# Patient Record
(demographics unavailable — no encounter records)

---

## 2024-11-12 NOTE — PLAN
[FreeTextEntry1] : Hand shaking/intention tremors.  Will check bw, especially thyroid levels.  If bw normal will refer to Neurology.    Pt had flu and covid vaccine at pharmacy.

## 2024-11-12 NOTE — HISTORY OF PRESENT ILLNESS
[FreeTextEntry1] : Pt here for follow up. c/o noticing that her hands have been shaking a little lately, especially when putting in earrings or trying to paint her nails. No tremors at rest.

## 2024-11-12 NOTE — PHYSICAL EXAM
[Normal] : normal rate, regular rhythm, normal S1 and S2 and no murmur heard [de-identified] : no visible hand tremors at rest

## 2025-04-22 NOTE — PHYSICAL EXAM
[Normal S1, S2] : normal S1, S2 [Normal] : soft, non-tender, no masses/organomegaly, normal bowel sounds [No Edema] : no edema [de-identified] : Grade 3 diastolic high-pitched murmur

## 2025-04-22 NOTE — ASSESSMENT
[FreeTextEntry1] : 90-year-old lady,Status post hospital stay for pneumonia and prolonged nursing home stay.  Hypertension.History of mild valvular insufficiency

## 2025-04-22 NOTE — DISCUSSION/SUMMARY
[Patient] : the patient [Risks] : risks [Benefits] : benefits [Alternatives] : alternatives [___ Month(s)] : in [unfilled] month(s) [FreeTextEntry1] : . Continue amlodipine. See PMD Annual follow and as needed. [EKG obtained to assist in diagnosis and management of assessed problem(s)] : EKG obtained to assist in diagnosis and management of assessed problem(s)

## 2025-04-22 NOTE — CARDIOLOGY SUMMARY
[de-identified] : April 12, 2022 sinus rhythm March 28, 2023 sinus rhythm September 26, 2023 sinus rhythm first-degree AV block  July 2024 sinus rhythm first-degree block [de-identified] : April 2021 mild to moderate aortic insufficiency normal LV function\par  December 2022 normal LV mild AI and MR [facility]